# Patient Record
Sex: FEMALE | URBAN - METROPOLITAN AREA
[De-identification: names, ages, dates, MRNs, and addresses within clinical notes are randomized per-mention and may not be internally consistent; named-entity substitution may affect disease eponyms.]

---

## 2022-03-31 ENCOUNTER — OFFICE VISIT (OUTPATIENT)
Dept: URBAN - METROPOLITAN AREA CLINIC 60 | Facility: CLINIC | Age: 57
End: 2022-03-31
Payer: COMMERCIAL

## 2022-03-31 DIAGNOSIS — H40.1134 PRIMARY OPEN-ANGLE GLAUCOMA, INDETERMINATE, BILATERAL: Primary | ICD-10-CM

## 2022-03-31 DIAGNOSIS — H52.13 MYOPIA, BILATERAL: ICD-10-CM

## 2022-03-31 PROCEDURE — 99204 OFFICE O/P NEW MOD 45 MIN: CPT | Performed by: OPTOMETRIST

## 2022-03-31 PROCEDURE — 92082 INTERMEDIATE VISUAL FIELD XM: CPT | Performed by: OPTOMETRIST

## 2022-03-31 PROCEDURE — 92133 CPTRZD OPH DX IMG PST SGM ON: CPT | Performed by: OPTOMETRIST

## 2022-03-31 PROCEDURE — 76514 ECHO EXAM OF EYE THICKNESS: CPT | Performed by: OPTOMETRIST

## 2022-03-31 RX ORDER — BIMATOPROST 0.1 MG/ML
0.01 % SOLUTION/ DROPS OPHTHALMIC
Qty: 0 | Refills: 0 | Status: ACTIVE
Start: 2022-03-31

## 2022-03-31 ASSESSMENT — VISUAL ACUITY
OS: 20/20
OD: 20/20

## 2022-03-31 ASSESSMENT — INTRAOCULAR PRESSURE
OD: 32
OS: 34

## 2022-03-31 NOTE — IMPRESSION/PLAN
Impression: Primary open-angle glaucoma, indeterminate, bilateral: H40.0364. *Start Lumigan QHS OU (elevated IOP 32-OD/ 34-OS)* Plan: IOP is elevated OU. Patient educated on findings. Baseline OCT(RNFL) and FDT done today to document any changes for future visits, results reviewed with patient. Pachymetry done today as well with thin CCT OU. Results of FDT normal OU and OCT is normal OD and abnormal OS. Due to testing and elevated IOP, will start patient on Lumigan QHS OU, sample given. Return in 3 weeks for a IOP check.